# Patient Record
Sex: FEMALE | Race: WHITE | NOT HISPANIC OR LATINO | Employment: FULL TIME | URBAN - METROPOLITAN AREA
[De-identification: names, ages, dates, MRNs, and addresses within clinical notes are randomized per-mention and may not be internally consistent; named-entity substitution may affect disease eponyms.]

---

## 2021-03-26 DIAGNOSIS — Z23 ENCOUNTER FOR IMMUNIZATION: ICD-10-CM

## 2022-06-22 ENCOUNTER — EVALUATION (OUTPATIENT)
Dept: PHYSICAL THERAPY | Facility: CLINIC | Age: 62
End: 2022-06-22
Payer: COMMERCIAL

## 2022-06-22 DIAGNOSIS — M25.531 RIGHT WRIST PAIN: ICD-10-CM

## 2022-06-22 DIAGNOSIS — M65.4 DE QUERVAIN'S TENOSYNOVITIS, RIGHT: Primary | ICD-10-CM

## 2022-06-22 PROCEDURE — 97140 MANUAL THERAPY 1/> REGIONS: CPT | Performed by: PHYSICAL THERAPIST

## 2022-06-22 PROCEDURE — 97110 THERAPEUTIC EXERCISES: CPT | Performed by: PHYSICAL THERAPIST

## 2022-06-22 PROCEDURE — 97161 PT EVAL LOW COMPLEX 20 MIN: CPT | Performed by: PHYSICAL THERAPIST

## 2022-06-22 RX ORDER — ROSUVASTATIN CALCIUM 10 MG/1
10 TABLET, COATED ORAL DAILY
COMMUNITY

## 2022-06-22 RX ORDER — LISINOPRIL 5 MG/1
5 TABLET ORAL DAILY
COMMUNITY

## 2022-06-22 RX ORDER — ESOMEPRAZOLE MAGNESIUM 40 MG/1
40 CAPSULE, DELAYED RELEASE ORAL
COMMUNITY

## 2022-06-22 RX ORDER — TRAMADOL HYDROCHLORIDE 50 MG/1
50 TABLET ORAL EVERY 6 HOURS PRN
COMMUNITY

## 2022-06-22 RX ORDER — HYDROCHLOROTHIAZIDE 25 MG/1
25 TABLET ORAL DAILY
COMMUNITY

## 2022-06-22 RX ORDER — CETIRIZINE HYDROCHLORIDE 10 MG/1
10 TABLET, CHEWABLE ORAL DAILY
COMMUNITY

## 2022-06-22 RX ORDER — CLOPIDOGREL BISULFATE 75 MG/1
75 TABLET ORAL DAILY
COMMUNITY

## 2022-06-22 RX ORDER — METHOCARBAMOL 750 MG/1
750 TABLET, FILM COATED ORAL EVERY 6 HOURS PRN
COMMUNITY

## 2022-06-22 NOTE — PROGRESS NOTES
PT Evaluation     Today's date: 2022  Patient name: Nicolette Neville  : 1960  MRN: [id number]  Referring provider: Charlene Dove MD  Dx:   Encounter Diagnosis     ICD-10-CM    1  De Quervain's tenosynovitis, right  M65 4    2  Right wrist pain  M25 531        Start Time: 1430  Stop Time: 1525  Total time in clinic (min): 55 minutes    Assessment  Assessment details: 58year old female 2 months post 1st dorsal compartment release, and CRT dominant R hand  Pain range 2-6/10  Pt wearing OTC wrist immobilization splint for work and bed  At times wears comfort cool wrist thumb neoprene support  Very limited AROM wrist: F/E: , with intrinsic stiffness all fingers and CMC stiffness R thumb   setting #2: R/10#, L/41#, lateral pinch: R/9#, L/11#, 3 point pinch: R/3#, L/7#  Pt limited with some self care, home chores and at work with use dominant R hand  DASH 48% disability  Pt would benefit from a course f PT hand therapy with goal of decreasing pain and restoring ROM, strength and function in dominant R hand  Functional limitations: unable to chop, peel, cut, chop, dress/bath I; pain with keyboarding, yard work, pain with use home chores; pain with writing  Symptom irritability: moderateUnderstanding of Dx/Px/POC: good   Prognosis: good    Goals  STG- 4 weeks  22  1  I HEP   2  Decrease pain to 2-4 pain range  3  Increase AROM R wrist to 45-55 flx/ext  4  Increase R  by 10#   5  Improve DASH score to under 30% disability    LTG- 8 weeks 22  1  Decrease pain to 0-2/10  2  Increase AROM R wrist equal to l  3  Increase  and pinch R hand to % of L,   4   Improve DASH to under 15% disability    Plan  Patient would benefit from: PT eval and skilled physical therapy  Planned modality interventions: thermotherapy: hydrocollator packs  Planned therapy interventions: manual therapy, therapeutic exercise, therapeutic activities and home exercise program  Frequency: 1x week  Duration in visits: 8  Duration in weeks: 8  Plan of Care beginning date: 2022  Plan of Care expiration date: 2022        Subjective Evaluation    History of Present Illness  Date of surgery: 2022  Mechanism of injury: surgery  Mechanism of injury: Pt underwent R 1st dorsal compartment release and R CTR on 22  Pt arrived wearing OTC wrist immobilization orthosis  Pt wearing splint at work and with activity at home, also wearing at night  Pt referred for outpatient hand therapy  Recurrent probem    Quality of life: good    Pain  Current pain ratin  At best pain ratin  At worst pain ratin  Quality: throbbing, sharp, knife-like and dull ache  Relieving factors: rest and heat  Progression: no change    Social Support  Steps to enter house: yes  2  Stairs in house: yes   14  Lives in: multiple-level home  Lives with: significant other    Employment status: working ( and [employer])  Treatments  Current treatment: physical therapy  Patient Goals  Patient goals for therapy: increased strength, decreased pain, independence with ADLs/IADLs and return to sport/leisure activities  Patient goal: work without pain        Objective     Observations     Right Wrist/Hand   Positive for incision  Additional Observation Details  1 cm incision radial wrist, 2 cm incision R palm, well healed, NEI    Tenderness     Right Wrist/Hand   Tenderness in the first dorsal compartment       Neurological Testing     Sensation     Wrist/Hand   Left   Intact: light touch    Right   Diminished: light touch    Comments   Right light touch: Numbness R volar thumb    Active Range of Motion     Right Elbow   Normal active range of motion  Forearm pronation: with pain    Left Wrist   Normal active range of motion    Right Wrist   Wrist flexion: 24 degrees   Wrist extension: 42 degrees     Right Thumb   Extension     CMC: 32  Palmar Abduction    CMC: 40  Opposition: Oppose to P1 segment R LF    Additional Active Range of Motion Details  Able to close hand to fulll fist with effort and pain, intrinsic stiffness all fingers R hand    Strength/Myotome Testing     Right Elbow   Flexion: 4  Extension: 4    Left Wrist/Hand      (2nd hand position)     Trial 1: 42    Trial 2: 40    Thumb Strength  Key/Lateral Pinch     Trial 1: 11  Palmar/Three-Point Pinch     Trial 1: 7    Right Wrist/Hand   Wrist extension: 4-  Wrist flexion: 3-     (2nd hand position)     Trial 1: 5    Trial 2: 16    Thumb Strength   Key/Lateral Pinch     Trial 1: 9  Palmar/Three-Point Pinch     Trial 1: 3    Swelling     Left Wrist/Hand   Circumference wrist: 14 5 cm    Right Wrist/Hand   Circumference wrist: 15 3 cm    General Comments:      Wrist/Hand Comments  6/22/22- DASH 48% disability      Flowsheet Rows    Flowsheet Row Most Recent Value   PT/OT G-Codes    Current Score 38   Projected Score 51   FOTO information reviewed Yes   Assessment Type Evaluation             Precautions: 1st dorsal compartment release CTR 4/22/22      Manuals 6/22            Massage radial wrist over 1st dorsal compartment CRR                                                   Neuro Re-Ed             Median n glides  Med n tensions NV                                                                                          Ther Ex             Tendon glides hook/full 10x 10x            Thumb CMC ext/abd  MCP/IP F/E 10x all            Self stretch first dorsal compartment NV            AROM wrist with open hand and  fist In sup/pron    10x all            AROM shoulder flx/abd  Elbow flx  Sup/pron -                                                   Ther Activity             Prayer stretch 2 x 15"            Wrist isometrics  Thumb isometrics NV            putty  digiflex NV                                      Modalities

## 2022-06-22 NOTE — LETTER
2022    MD Armando [address] [place] [address]    Patient: Branden Hutton   YOB: 1960   Date of Visit: 2022     Encounter Diagnosis     ICD-10-CM    1  De Quervain's tenosynovitis, right  M65 4    2  Right wrist pain  M25 531        Dear Dr Mendiola Service:    Thank you for your recent referral of Branden Hutton  Please review the attached evaluation summary from [address] recent visit  Please verify that you agree with the plan of care by signing the attached order  If you have any questions or concerns, please do not hesitate to call  I sincerely appreciate the opportunity to share in the care of one of your patients and hope to have another opportunity to work with you in the near future  Sincerely,    Mojgan Townsend, PT      Referring Provider:      I certify that I have read the below Plan of Care and certify the need for these services furnished under this plan of treatment while under my care  MD [address]ando [address] [id number]  Via Fax: [phone]          PT Evaluation     Today's date: 2022  Patient name: Branden Hutton  : 1960  MRN: [id number]  Referring provider: Ana Laura Avilez MD  Dx:   Encounter Diagnosis     ICD-10-CM    1  De Quervain's tenosynovitis, right  M65 4    2  Right wrist pain  M25 531        Start Time: 1430  Stop Time: 1525  Total time in clinic (min): 55 minutes    Assessment  Assessment details: 58year old female 2 months post 1st dorsal compartment release, and CRT dominant R hand  Pain range 2-6/10  Pt wearing OTC wrist immobilization splint for work and bed  At times wears comfort cool wrist thumb neoprene support  Very limited AROM wrist: F/E: , with intrinsic stiffness all fingers and CMC stiffness R thumb   setting #2: R/10#, L/41#, lateral pinch: R/9#, L/11#, 3 point pinch: R/3#, L/7#    Pt limited with some self care, home chores and at work with use dominant R hand  DASH 48% disability  Pt would benefit from a course f PT hand therapy with goal of decreasing pain and restoring ROM, strength and function in dominant R hand  Functional limitations: unable to chop, peel, cut, chop, dress/bath I; pain with keyboarding, yard work, pain with use home chores; pain with writing  Symptom irritability: moderateUnderstanding of Dx/Px/POC: good   Prognosis: good    Goals  STG- 4 weeks  22  1  I HEP   2  Decrease pain to 2-4 pain range  3  Increase AROM R wrist to 45-55 flx/ext  4  Increase R  by 10#   5  Improve DASH score to under 30% disability    LTG- 8 weeks 22  1  Decrease pain to 0-2/10  2  Increase AROM R wrist equal to l  3  Increase  and pinch R hand to % of L,   4  Improve DASH to under 15% disability    Plan  Patient would benefit from: PT eval and skilled physical therapy  Planned modality interventions: thermotherapy: hydrocollator packs  Planned therapy interventions: manual therapy, therapeutic exercise, therapeutic activities and home exercise program  Frequency: 1x week  Duration in visits: 8  Duration in weeks: 8  Plan of Care beginning date: 2022  Plan of Care expiration date: 2022        Subjective Evaluation    History of Present Illness  Date of surgery: 2022  Mechanism of injury: surgery  Mechanism of injury: Pt underwent R 1st dorsal compartment release and R CTR on 22  Pt arrived wearing OTC wrist immobilization orthosis  Pt wearing splint at work and with activity at home, also wearing at night  Pt referred for outpatient hand therapy            Recurrent probem    Quality of life: good    Pain  Current pain ratin  At best pain ratin  At worst pain ratin  Quality: throbbing, sharp, knife-like and dull ache  Relieving factors: rest and heat  Progression: no change    Social Support  Steps to enter house: yes  2  Stairs in house: yes   14  Lives in: multiple-level home  Lives with: significant other    Employment status: working ( and [employer])  Treatments  Current treatment: physical therapy  Patient Goals  Patient goals for therapy: increased strength, decreased pain, independence with ADLs/IADLs and return to sport/leisure activities  Patient goal: work without pain        Objective     Observations     Right Wrist/Hand   Positive for incision  Additional Observation Details  1 cm incision radial wrist, 2 cm incision R palm, well healed, NEI    Tenderness     Right Wrist/Hand   Tenderness in the first dorsal compartment       Neurological Testing     Sensation     Wrist/Hand   Left   Intact: light touch    Right   Diminished: light touch    Comments   Right light touch: Numbness R volar thumb    Active Range of Motion     Right Elbow   Normal active range of motion  Forearm pronation: with pain    Left Wrist   Normal active range of motion    Right Wrist   Wrist flexion: 24 degrees   Wrist extension: 42 degrees     Right Thumb   Extension     CMC: 32  Palmar Abduction    CMC: 40  Opposition: Oppose to P1 segment R LF    Additional Active Range of Motion Details  Able to close hand to fulll fist with effort and pain, intrinsic stiffness all fingers R hand    Strength/Myotome Testing     Right Elbow   Flexion: 4  Extension: 4    Left Wrist/Hand      (2nd hand position)     Trial 1: 42    Trial 2: 40    Thumb Strength  Key/Lateral Pinch     Trial 1: 11  Palmar/Three-Point Pinch     Trial 1: 7    Right Wrist/Hand   Wrist extension: 4-  Wrist flexion: 3-     (2nd hand position)     Trial 1: 5    Trial 2: 16    Thumb Strength   Key/Lateral Pinch     Trial 1: 9  Palmar/Three-Point Pinch     Trial 1: 3    Swelling     Left Wrist/Hand   Circumference wrist: 14 5 cm    Right Wrist/Hand   Circumference wrist: 15 3 cm    General Comments:      Wrist/Hand Comments  6/22/22- DASH 48% disability      Flowsheet Rows    Flowsheet Row Most Recent Value PT/OT G-Codes    Current Score 38   Projected Score 51   FOTO information reviewed Yes   Assessment Type Evaluation             Precautions: 1st dorsal compartment release CTR 4/22/22      Manuals 6/22            Massage radial wrist over 1st dorsal compartment CRR                                                   Neuro Re-Ed             Median n glides  Med n tensions NV                                                                                          Ther Ex             Tendon glides hook/full 10x 10x            Thumb CMC ext/abd  MCP/IP F/E 10x all            Self stretch first dorsal compartment NV            AROM wrist with open hand and  fist In sup/pron    10x all            AROM shoulder flx/abd  Elbow flx  Sup/pron -                                                   Ther Activity             Prayer stretch 2 x 15"            Wrist isometrics  Thumb isometrics NV            putty  digiflex NV                                      Modalities

## 2022-06-30 ENCOUNTER — OFFICE VISIT (OUTPATIENT)
Dept: PHYSICAL THERAPY | Facility: CLINIC | Age: 62
End: 2022-06-30
Payer: COMMERCIAL

## 2022-06-30 DIAGNOSIS — M65.4 DE QUERVAIN'S TENOSYNOVITIS, RIGHT: ICD-10-CM

## 2022-06-30 DIAGNOSIS — M25.531 RIGHT WRIST PAIN: Primary | ICD-10-CM

## 2022-06-30 PROCEDURE — 97110 THERAPEUTIC EXERCISES: CPT | Performed by: PHYSICAL THERAPIST

## 2022-06-30 PROCEDURE — 97530 THERAPEUTIC ACTIVITIES: CPT | Performed by: PHYSICAL THERAPIST

## 2022-06-30 PROCEDURE — 97140 MANUAL THERAPY 1/> REGIONS: CPT | Performed by: PHYSICAL THERAPIST

## 2022-06-30 NOTE — PROGRESS NOTES
Daily Note     Today's date: 2022  Patient name: Licha Jacobsen  : 1960  MRN: [id number]  Referring provider: Isabella Rucker MD  Dx:   Encounter Diagnosis     ICD-10-CM    1  Right wrist pain  M25 531    2  De Quervain's tenosynovitis, right  M65 4                   Subjective: Constant ache 2/10, @ worst 10/10 when bumped  Objective: See treatment diary below      Assessment: Finger flexibility improved, able to close hand to full fist with ease today  IF/MF intrinsics exhibit mild stiffness  Wrist stiffness persists although improved  Pain end range wrist flx and ext, pain end range forearm pronation  Initiated isometric strengthening for wrist and thumb extensors without increase in pain  Continue PT with goal of resuming all pre-morbid activity  Plan: Progress treatment as tolerated         Precautions: 1st dorsal compartment release CTR 22      Manuals            Massage radial wrist over 1st dorsal compartment CRR CRR           PROM/sretch jt mobs R wrist for flx/ext  CRR                                     Neuro Re-Ed             Median n glides  Med n tensions NV 10x  -                                                                                         Ther Ex             Tendon glides hook/full 10x 10x 2 x 10/10           Thumb CMC ext/abd  MCP/IP F/E 10x all 2 x 10           Self stretch first dorsal compartment NV 2 x 15"           AROM wrist with open hand and  fist In sup/pron    10x all 20x in sup/pron open hand and fist           AROM shoulder flx/abd  Elbow flx  Sup/pron - Sup/prom  20x open hand and fist                                                  Ther Activity             Prayer stretch 2 x 15" 2 x 15'           Wrist isometrics  Thumb isometrics NV 10x all  10x all           putty  digiflex NV  1 min  digiflex Y 10x all, 10x each                                     Modalities

## 2022-07-06 ENCOUNTER — APPOINTMENT (OUTPATIENT)
Dept: PHYSICAL THERAPY | Facility: CLINIC | Age: 62
End: 2022-07-06
Payer: COMMERCIAL

## 2022-07-13 ENCOUNTER — OFFICE VISIT (OUTPATIENT)
Dept: PHYSICAL THERAPY | Facility: CLINIC | Age: 62
End: 2022-07-13
Payer: COMMERCIAL

## 2022-07-13 DIAGNOSIS — M25.531 RIGHT WRIST PAIN: ICD-10-CM

## 2022-07-13 DIAGNOSIS — M65.4 DE QUERVAIN'S TENOSYNOVITIS, RIGHT: Primary | ICD-10-CM

## 2022-07-13 PROCEDURE — 97530 THERAPEUTIC ACTIVITIES: CPT | Performed by: PHYSICAL THERAPIST

## 2022-07-13 PROCEDURE — 97140 MANUAL THERAPY 1/> REGIONS: CPT | Performed by: PHYSICAL THERAPIST

## 2022-07-13 PROCEDURE — 97110 THERAPEUTIC EXERCISES: CPT | Performed by: PHYSICAL THERAPIST

## 2022-07-13 NOTE — PROGRESS NOTES
Daily Note     Today's date: 2022  Patient name: Clifford Ivan  : 1960  MRN: [id number]  Referring provider: Cruz Moran MD  Dx:   Encounter Diagnosis     ICD-10-CM    1  De Quervain's tenosynovitis, right  M65 4    2  Right wrist pain  M25 531        Start Time: 1700  Stop Time: 1740  Total time in clinic (min): 40 minutes    Subjective: Current pain 2/10, @ best 2/10, @ worst 6/10      Objective: See treatment diary below      Assessment: Slow progress with flexibility, stiffness persists  Radial wrist pain with resistive wrist flx/ext  Initiated 1# PRE's for wrist with some pain noted  Progressing with ROM, strengthening for wrist and   Continue PT with goal of increasing activity level without increase in pain  Plan: Progress treatment as tolerated         Precautions: 1st dorsal compartment release CTR 22      Manuals           Massage radial wrist over 1st dorsal compartment CRR CRR CRR          PROM/sretch jt mobs R wrist for flx/ext  CRR CRR                                    Neuro Re-Ed             Median n glides  Med n tensions NV 10x  - 10x                                                                                        Ther Ex             Tendon glides hook/full 10x 10x 2 x 10/10 2 x 10/10          Thumb CMC ext/abd  MCP/IP F/E 10x all 2 x 10 2 x 10          Self stretch first dorsal compartment NV 2 x 15" 2 x 15"          AROM wrist with open hand and  fist In sup/pron    10x all 20x in sup/pron open hand and fist Sup/pron 1# hammer 20x    Wrist F/E 20x/20x 1#          AROM shoulder flx/abd  Elbow flx  Sup/pron - Sup/prom  20x open hand and fist                                                  Ther Activity             Prayer stretch 2 x 15" 2 x 15' 2 x 15"          Wrist isometrics  Thumb isometrics NV 10x all  10x all 10x   10x          putty  digiflex NV  1 min  digiflex Y 10x all, 10x each digiflex Y/R 10x all, 10x each Modalities

## 2022-07-14 ENCOUNTER — APPOINTMENT (OUTPATIENT)
Dept: PHYSICAL THERAPY | Facility: CLINIC | Age: 62
End: 2022-07-14
Payer: COMMERCIAL

## 2022-07-21 ENCOUNTER — OFFICE VISIT (OUTPATIENT)
Dept: PHYSICAL THERAPY | Facility: CLINIC | Age: 62
End: 2022-07-21
Payer: COMMERCIAL

## 2022-07-21 DIAGNOSIS — M65.4 DE QUERVAIN'S TENOSYNOVITIS, RIGHT: ICD-10-CM

## 2022-07-21 DIAGNOSIS — M25.531 RIGHT WRIST PAIN: Primary | ICD-10-CM

## 2022-07-21 PROCEDURE — 97530 THERAPEUTIC ACTIVITIES: CPT | Performed by: PHYSICAL THERAPIST

## 2022-07-21 PROCEDURE — 97140 MANUAL THERAPY 1/> REGIONS: CPT | Performed by: PHYSICAL THERAPIST

## 2022-07-21 PROCEDURE — 97110 THERAPEUTIC EXERCISES: CPT | Performed by: PHYSICAL THERAPIST

## 2022-07-21 NOTE — PROGRESS NOTES
Daily Note     Today's date: 2022  Patient name: Shoshana Mcdonnell  : 1960  MRN: [id number]  Referring provider: Yarely Tarango MD  Dx:   Encounter Diagnosis     ICD-10-CM    1  Right wrist pain  M25 531    2  De Quervain's tenosynovitis, right  M65 4                   Subjective:  Pain range 2/10 currently, @ worst 6/10  More pain with use, especially when writing  Objective: See treatment diary below      Assessment: Pt very guarded with all use R wrist and hand, continues to have pain with end range ext  Progressing with strengthening as tolerated; arjun interventions to tolerance  Less radial wrist pain  Continue PT with goal of resuming pre-morbid activity  Plan: Progress treatment as tolerated         Precautions: 1st dorsal compartment release CTR 22      Manuals          Massage radial wrist over 1st dorsal compartment CRR CRR CRR CRR  IASTM dorsal/radial wrist and forearm         PROM/sretch jt mobs R wrist for flx/ext  CRR CRR CRR                          FOTO         Neuro Re-Ed             Median n glides  Med n tensions NV 10x  - 10x 10x                                                                                       Ther Ex             Tendon glides hook/full 10x 10x 2 x 10/10 2 x 10/10 2 x 10/10         Thumb CMC ext/abd  MCP/IP F/E 10x all 2 x 10 2 x 10 2 x 10         Self stretch first dorsal compartment NV 2 x 15" 2 x 15" 2 x 15"         AROM wrist with open hand and  fist In sup/pron    10x all 20x in sup/pron open hand and fist Sup/pron 1# hammer 20x    Wrist F/E 20x/20x 1# 20x open hand and fist in sup and pron    20x wrist ZF/E 1#    20x 1# hammer         AROM shoulder flx/abd  Elbow flx  Sup/pron - Sup/prom  20x open hand and fist                                                  Ther Activity             Prayer stretch 2 x 15" 2 x 15' 2 x 15" 2 x 15"         Wrist isometrics  Thumb isometrics NV 10x all  10x all 10x   10x 10x    10x putty  digiflex NV  1 min  digiflex Y 10x all, 10x each digiflex Y/R 10x all, 10x each    digiflex Y/R 10x all, 10x each         Red flexbar    20x F/E                      Modalities

## 2022-07-28 ENCOUNTER — OFFICE VISIT (OUTPATIENT)
Dept: PHYSICAL THERAPY | Facility: CLINIC | Age: 62
End: 2022-07-28
Payer: COMMERCIAL

## 2022-07-28 DIAGNOSIS — M65.4 DE QUERVAIN'S TENOSYNOVITIS, RIGHT: Primary | ICD-10-CM

## 2022-07-28 DIAGNOSIS — M25.531 RIGHT WRIST PAIN: ICD-10-CM

## 2022-07-28 PROCEDURE — 97110 THERAPEUTIC EXERCISES: CPT | Performed by: PHYSICAL THERAPIST

## 2022-07-28 PROCEDURE — 97530 THERAPEUTIC ACTIVITIES: CPT | Performed by: PHYSICAL THERAPIST

## 2022-07-28 PROCEDURE — 97140 MANUAL THERAPY 1/> REGIONS: CPT | Performed by: PHYSICAL THERAPIST

## 2022-07-28 NOTE — PROGRESS NOTES
Daily Note     Today's date: 2022  Patient name: Guy Martin  : 1960  MRN: [id number]  Referring provider: Dave Arnold MD  Dx:   Encounter Diagnosis     ICD-10-CM    1  De Quervain's tenosynovitis, right  M65 4    2  Right wrist pain  M25 531        Start Time: 1600  Stop Time: 1640  Total time in clinic (min): 40 minutes    Subjective: Pt states R wrist is sore from working on computer all day, current pain 3/10, @ best 1/10, @ worst 5/10      Objective: See treatment diary below      Assessment: Improving ROM, still stiff in wrist ext but less so  Has had several days this week with very minimal pain  Progressing with arjun interventions for flexibility and strengthening  Continue PT with goal of resuming all pre-morbid activity with minimal pain  Plan: Progress treatment as tolerated         Precautions: 1st dorsal compartment release CTR 22      Manuals         Massage radial wrist over 1st dorsal compartment CRR CRR CRR CRR  IASTM dorsal/radial wrist and forearm CRR        PROM/sretch jt mobs R wrist for flx/ext  CRR CRR CRR CRR                         FOTO         Neuro Re-Ed             Median n glides  Med n tensions NV 10x  - 10x 10x 10x                                                                                      Ther Ex             Tendon glides hook/full 10x 10x 2 x 10/10 2 x 10/10 2 x 10/10 2 x 10  2 x 10        Thumb CMC ext/abd  MCP/IP F/E 10x all 2 x 10 2 x 10 2 x 10 2 x 10        Self stretch first dorsal compartment NV 2 x 15" 2 x 15" 2 x 15" 2 x 15"        AROM wrist with open hand and  fist In sup/pron    10x all 20x in sup/pron open hand and fist Sup/pron 1# hammer 20x    Wrist F/E 20x/20x 1# 20x open hand and fist in sup and pron    20x wrist ZF/E 1#    20x 1# hammer 20x/20x F/E 1#    20x 1# hammer        AROM shoulder flx/abd  Elbow flx  Sup/pron - Sup/prom  20x open hand and fist                                                  Ther Activity             Prayer stretch 2 x 15" 2 x 15' 2 x 15" 2 x 15" 2 x 15"        Wrist isometrics  Thumb isometrics NV 10x all  10x all 10x   10x 10x    10x 10x    10x        putty  digiflex NV  1 min  digiflex Y 10x all, 10x each digiflex Y/R 10x all, 10x each    digiflex Y/R 10x all, 10x each Y/R/G 10x all, 10 x each        Red flexbar    20x F/E 20x F/E        Wrist maze exerciser     4x        Modalities

## 2022-08-10 ENCOUNTER — EVALUATION (OUTPATIENT)
Dept: PHYSICAL THERAPY | Facility: CLINIC | Age: 62
End: 2022-08-10
Payer: COMMERCIAL

## 2022-08-10 DIAGNOSIS — M65.4 DE QUERVAIN'S TENOSYNOVITIS, RIGHT: ICD-10-CM

## 2022-08-10 DIAGNOSIS — M25.531 RIGHT WRIST PAIN: Primary | ICD-10-CM

## 2022-08-10 PROCEDURE — 97530 THERAPEUTIC ACTIVITIES: CPT | Performed by: PHYSICAL THERAPIST

## 2022-08-10 PROCEDURE — 97164 PT RE-EVAL EST PLAN CARE: CPT | Performed by: PHYSICAL THERAPIST

## 2022-08-10 PROCEDURE — 97110 THERAPEUTIC EXERCISES: CPT | Performed by: PHYSICAL THERAPIST

## 2022-08-10 PROCEDURE — 97140 MANUAL THERAPY 1/> REGIONS: CPT | Performed by: PHYSICAL THERAPIST

## 2022-08-10 NOTE — LETTER
2022    Anna Hartman MD  [facility] [address] [place] [address]    Patient: Shamar Pickens   YOB: 1960   Date of Visit: 8/10/2022     Encounter Diagnosis     ICD-10-CM    1  Right wrist pain  M25 531    2  De Quervain's tenosynovitis, right  M65 4        Dear Dr Bubba Cockayne:    Thank you for your recent referral of Shamar Pickens  Please review the attached evaluation summary from [address] recent visit  Please verify that you agree with the plan of care by signing the attached order  If you have any questions or concerns, please do not hesitate to call  I sincerely appreciate the opportunity to share in the care of one of your patients and hope to have another opportunity to work with you in the near future  Sincerely,    Kendrick Naylor, PT      Referring Provider:      I certify that I have read the below Plan of Care and certify the need for these services furnished under this plan of treatment while under my care  Anna Hartman MD  [facility]  Via Fax: [phone]          PT Re-Evaluation     Today's date: 8/10/2022  Patient name: Shamar Pickens  : 1960  MRN: [id number]  Referring provider: Mario Lowe MD  Dx:   Encounter Diagnosis     ICD-10-CM    1  Right wrist pain  M25 531    2  De Quervain's tenosynovitis, right  M65 4        Start Time: 8511  Stop Time: 1625  Total time in clinic (min): 55 minutes    Assessment  Assessment details: 58year old female 3 months post 1st dorsal compartment release, and CRT dominant R hand  Pain range 2-6/10  No longer wearing splint to work  Very limited AROM wrist: F/E: 24/42, with intrinsic stiffness all fingers and CMC stiffness R thumb   setting #2: R/25#, L/42#, lateral pinch: R/9#, L/11#, 3 point pinch: R/5#, L/8#  Improved function with use of R hand, using more for home chores and at work as   Able to complete some yard work  Still painful with writing, better with built up pen  DASH 31% disability  Continue PT 2-4 weeks tapering to DC with goal of increasing activity level with less pain  Functional limitations: all function improved with less pain with exception of writing  Pain with prolonged use of computer  Symptom irritability: moderateUnderstanding of Dx/Px/POC: good   Prognosis: good    Goals  STG- 4 weeks  22  1  I HEP (MET)  2  Decrease pain to 2-4 pain range (progressing)  3  Increase AROM R wrist to 45-55 flx/ext (progressing)  4  Increase R  by 10# (MET)  5  Improve DASH score to under 30% disability    LTG- 8 weeks 22  1  Decrease pain to 0-2/10  2  Increase AROM R wrist equal to l  3  Increase  and pinch R hand to % of L,   4  Improve DASH to under 15% disability    Plan  Patient would benefit from: PT eval and skilled physical therapy  Planned modality interventions: thermotherapy: hydrocollator packs  Planned therapy interventions: manual therapy, therapeutic exercise, therapeutic activities and home exercise program  Frequency: 1x week  Duration in visits: 4  Duration in weeks: 4  Plan of Care beginning date: 2022  Plan of Care expiration date: 2022        Subjective Evaluation    History of Present Illness  Date of surgery: 2022  Mechanism of injury: surgery  Mechanism of injury: 8/10/22- Minimal ache currently, 1-6/10 range  Very painful when bumped  Pt notes improvement in activity and strength  Pt underwent R 1st dorsal compartment release and R CTR on 22  Pt arrived wearing OTC wrist immobilization orthosis  Pt wearing splint at work and with activity at home, also wearing at night  Pt referred for outpatient hand therapy            Recurrent probem    Quality of life: good    Pain  Current pain ratin  At best pain ratin  At worst pain ratin  Quality: sharp and dull ache  Relieving factors: rest and heat  Progression: no change    Social Support  Steps to enter house: yes  2  Stairs in house: yes   14  Lives in: multiple-level home  Lives with: significant other    Employment status: working ( and [employer])  Treatments  Current treatment: physical therapy  Patient Goals  Patient goals for therapy: increased strength, decreased pain, independence with ADLs/IADLs and return to sport/leisure activities  Patient goal: work without pain        Objective     Observations     Right Wrist/Hand   Positive for incision  Additional Observation Details  1 cm incision radial wrist, 2 cm incision R palm, well healed, NEI    Tenderness     Right Wrist/Hand   Tenderness in the first dorsal compartment       Neurological Testing     Sensation     Wrist/Hand   Left   Intact: light touch    Right   Intact: light touch    Comments   Right light touch: Numbness R volar thumb    Active Range of Motion     Right Elbow   Normal active range of motion  Forearm pronation: with pain    Left Wrist   Normal active range of motion    Right Wrist   Wrist flexion: 35 degrees   Wrist extension: 55 degrees   Radial deviation: 5 degrees   Ulnar deviation: 18 degrees     Right Thumb   Extension     CMC: 32  Palmar Abduction    CMC: 40  Opposition: Oppose to P1 segment R LF    Additional Active Range of Motion Details  Intrinsic stiffness resolved with exception of IF    Strength/Myotome Testing     Right Elbow   Flexion: 4  Extension: 4    Left Wrist/Hand      (2nd hand position)     Trial 1: 38    Trial 2: 42    Thumb Strength  Key/Lateral Pinch     Trial 1: 11  Palmar/Three-Point Pinch     Trial 1: 8    Trial 2: 9    Right Wrist/Hand   Wrist extension: 4-  Wrist flexion: 4     (2nd hand position)     Trial 1: 17    Trial 2: 25    Thumb Strength   Key/Lateral Pinch     Trial 1: 9  Palmar/Three-Point Pinch     Trial 1: 5    Trial 2: 5    Swelling     Left Wrist/Hand   Circumference wrist: 14 5 cm    Right Wrist/Hand   Circumference wrist: 15 3 cm    General Comments:      Wrist/Hand Comments  6/22/22- DASH 48% disability    8/10/22- DASH 31% disability             Precautions: 1st dorsal compartment release CTR 4/22/22        Manuals 6/22 6/30 7/13 7/21 7/28 8/10       Massage radial wrist over 1st dorsal compartment CRR CRR CRR CRR  IASTM dorsal/radial wrist and forearm CRR CRR       PROM/sretch jt mobs R wrist for flx/ext  CRR CRR CRR CRR CRR                        FOTO         Neuro Re-Ed             Median n glides  Med n tensions NV 10x  - 10x 10x 10x 10x                                                                                     Ther Ex             Tendon glides hook/full 10x 10x 2 x 10/10 2 x 10/10 2 x 10/10 2 x 10  2 x 10 2 x 10  2 x 10       Thumb CMC ext/abd  MCP/IP F/E 10x all 2 x 10 2 x 10 2 x 10 2 x 10 2 x 10       Self stretch first dorsal compartment NV 2 x 15" 2 x 15" 2 x 15" 2 x 15" 2 x 15"       AROM wrist with open hand and  fist In sup/pron    10x all 20x in sup/pron open hand and fist Sup/pron 1# hammer 20x    Wrist F/E 20x/20x 1# 20x open hand and fist in sup and pron    20x wrist ZF/E 1#    20x 1# hammer 20x/20x F/E 1#    20x 1# hammer 20x/20x 2#    20x 1# hammer       AROM shoulder flx/abd  Elbow flx  Sup/pron - Sup/prom  20x open hand and fist                                                  Ther Activity             Prayer stretch 2 x 15" 2 x 15' 2 x 15" 2 x 15" 2 x 15" 2 x 15"       Wrist isometrics  Thumb isometrics NV 10x all  10x all 10x   10x 10x    10x 10x    10x 10x    10x       putty  digiflex      clothespins NV  1 min  digiflex Y 10x all, 10x each digiflex Y/R 10x all, 10x each    digiflex Y/R 10x all, 10x each Y/R/G 10x all, 10 x each Y/R/G  10x al, 10x each  Y/R 3 point pinch 10x each, green was painful       Red flexbar    20x F/E 20x F/E 20x/20x       Wrist maze exerciser     4x rubberband finger ext over jar  20x       Modalities

## 2022-08-10 NOTE — PROGRESS NOTES
PT Re-Evaluation     Today's date: 8/10/2022  Patient name: Brandie Sanford  : 1960  MRN: [id number]  Referring provider: Satish Kirkland MD  Dx:   Encounter Diagnosis     ICD-10-CM    1  Right wrist pain  M25 531    2  De Quervain's tenosynovitis, right  M65 4        Start Time:   Stop Time: 1625  Total time in clinic (min): 55 minutes    Assessment  Assessment details: 58year old female 3 months post 1st dorsal compartment release, and CRT dominant R hand  Pain range 2-6/10  No longer wearing splint to work  Very limited AROM wrist: F/E: 24/42, with intrinsic stiffness all fingers and CMC stiffness R thumb   setting #2: R/25#, L/42#, lateral pinch: R/9#, L/11#, 3 point pinch: R/5#, L/8#  Improved function with use of R hand, using more for home chores and at work as   Able to complete some yard work  Still painful with writing, better with built up pen  DASH 31% disability  Continue PT 2-4 weeks tapering to DC with goal of increasing activity level with less pain  Functional limitations: all function improved with less pain with exception of writing  Pain with prolonged use of computer  Symptom irritability: moderateUnderstanding of Dx/Px/POC: good   Prognosis: good    Goals  STG- 4 weeks  22  1  I HEP (MET)  2  Decrease pain to 2-4 pain range (progressing)  3  Increase AROM R wrist to 45-55 flx/ext (progressing)  4  Increase R  by 10# (MET)  5  Improve DASH score to under 30% disability    LTG- 8 weeks 22  1  Decrease pain to 0-2/10  2  Increase AROM R wrist equal to l  3  Increase  and pinch R hand to % of L,   4   Improve DASH to under 15% disability    Plan  Patient would benefit from: PT eval and skilled physical therapy  Planned modality interventions: thermotherapy: hydrocollator packs  Planned therapy interventions: manual therapy, therapeutic exercise, therapeutic activities and home exercise program  Frequency: 1x week  Duration in visits: 4  Duration in weeks: 4  Plan of Care beginning date: 2022  Plan of Care expiration date: 2022        Subjective Evaluation    History of Present Illness  Date of surgery: 2022  Mechanism of injury: surgery  Mechanism of injury: 8/10/22- Minimal ache currently, 1-6/10 range  Very painful when bumped  Pt notes improvement in activity and strength  Pt underwent R 1st dorsal compartment release and R CTR on 22  Pt arrived wearing OTC wrist immobilization orthosis  Pt wearing splint at work and with activity at home, also wearing at night  Pt referred for outpatient hand therapy  Recurrent probem    Quality of life: good    Pain  Current pain ratin  At best pain ratin  At worst pain ratin  Quality: sharp and dull ache  Relieving factors: rest and heat  Progression: no change    Social Support  Steps to enter house: yes  2  Stairs in house: yes   14  Lives in: multiple-level home  Lives with: significant other    Employment status: working ( and [employer])  Treatments  Current treatment: physical therapy  Patient Goals  Patient goals for therapy: increased strength, decreased pain, independence with ADLs/IADLs and return to sport/leisure activities  Patient goal: work without pain        Objective     Observations     Right Wrist/Hand   Positive for incision  Additional Observation Details  1 cm incision radial wrist, 2 cm incision R palm, well healed, NEI    Tenderness     Right Wrist/Hand   Tenderness in the first dorsal compartment       Neurological Testing     Sensation     Wrist/Hand   Left   Intact: light touch    Right   Intact: light touch    Comments   Right light touch: Numbness R volar thumb    Active Range of Motion     Right Elbow   Normal active range of motion  Forearm pronation: with pain    Left Wrist   Normal active range of motion    Right Wrist   Wrist flexion: 35 degrees   Wrist extension: 55 degrees   Radial deviation: 5 degrees   Ulnar deviation: 18 degrees     Right Thumb   Extension     CMC: 32  Palmar Abduction    CMC: 40  Opposition: Oppose to P1 segment R LF    Additional Active Range of Motion Details  Intrinsic stiffness resolved with exception of IF    Strength/Myotome Testing     Right Elbow   Flexion: 4  Extension: 4    Left Wrist/Hand      (2nd hand position)     Trial 1: 38    Trial 2: 42    Thumb Strength  Key/Lateral Pinch     Trial 1: 11  Palmar/Three-Point Pinch     Trial 1: 8    Trial 2: 9    Right Wrist/Hand   Wrist extension: 4-  Wrist flexion: 4     (2nd hand position)     Trial 1: 17    Trial 2: 25    Thumb Strength   Key/Lateral Pinch     Trial 1: 9  Palmar/Three-Point Pinch     Trial 1: 5    Trial 2: 5    Swelling     Left Wrist/Hand   Circumference wrist: 14 5 cm    Right Wrist/Hand   Circumference wrist: 15 3 cm    General Comments:      Wrist/Hand Comments  6/22/22- DASH 48% disability    8/10/22- DASH 31% disability             Precautions: 1st dorsal compartment release CTR 4/22/22        Manuals 6/22 6/30 7/13 7/21 7/28 8/10       Massage radial wrist over 1st dorsal compartment CRR CRR CRR CRR  IASTM dorsal/radial wrist and forearm CRR CRR       PROM/sretch jt mobs R wrist for flx/ext  CRR CRR CRR CRR CRR                        FOTO         Neuro Re-Ed             Median n glides  Med n tensions NV 10x  - 10x 10x 10x 10x                                                                                     Ther Ex             Tendon glides hook/full 10x 10x 2 x 10/10 2 x 10/10 2 x 10/10 2 x 10  2 x 10 2 x 10  2 x 10       Thumb CMC ext/abd  MCP/IP F/E 10x all 2 x 10 2 x 10 2 x 10 2 x 10 2 x 10       Self stretch first dorsal compartment NV 2 x 15" 2 x 15" 2 x 15" 2 x 15" 2 x 15"       AROM wrist with open hand and  fist In sup/pron    10x all 20x in sup/pron open hand and fist Sup/pron 1# hammer 20x    Wrist F/E 20x/20x 1# 20x open hand and fist in sup and pron    20x wrist ZF/E 1#    20x 1# hammer 20x/20x F/E 1#    20x 1# hammer 20x/20x 2#    20x 1# hammer       AROM shoulder flx/abd  Elbow flx  Sup/pron - Sup/prom  20x open hand and fist                                                  Ther Activity             Prayer stretch 2 x 15" 2 x 15' 2 x 15" 2 x 15" 2 x 15" 2 x 15"       Wrist isometrics  Thumb isometrics NV 10x all  10x all 10x   10x 10x    10x 10x    10x 10x    10x       putty  digiflex      clothespins NV  1 min  digiflex Y 10x all, 10x each digiflex Y/R 10x all, 10x each    digiflex Y/R 10x all, 10x each Y/R/G 10x all, 10 x each Y/R/G  10x al, 10x each  Y/R 3 point pinch 10x each, green was painful       Red flexbar    20x F/E 20x F/E 20x/20x       Wrist maze exerciser     4x rubberband finger ext over jar  20x       Modalities

## 2022-08-11 ENCOUNTER — APPOINTMENT (OUTPATIENT)
Dept: PHYSICAL THERAPY | Facility: CLINIC | Age: 62
End: 2022-08-11
Payer: COMMERCIAL

## 2022-08-24 ENCOUNTER — OFFICE VISIT (OUTPATIENT)
Dept: PHYSICAL THERAPY | Facility: CLINIC | Age: 62
End: 2022-08-24
Payer: COMMERCIAL

## 2022-08-24 DIAGNOSIS — M65.4 DE QUERVAIN'S TENOSYNOVITIS, RIGHT: Primary | ICD-10-CM

## 2022-08-24 DIAGNOSIS — M25.531 RIGHT WRIST PAIN: ICD-10-CM

## 2022-08-24 PROCEDURE — 97140 MANUAL THERAPY 1/> REGIONS: CPT | Performed by: PHYSICAL THERAPIST

## 2022-08-24 PROCEDURE — 97110 THERAPEUTIC EXERCISES: CPT | Performed by: PHYSICAL THERAPIST

## 2022-08-24 PROCEDURE — 97530 THERAPEUTIC ACTIVITIES: CPT | Performed by: PHYSICAL THERAPIST

## 2022-08-24 NOTE — PROGRESS NOTES
Daily Note     Today's date: 2022  Patient name: Yumiko Tirado  : 1960  MRN: [id number]  Referring provider: Lian Grijalva MD  Dx:   Encounter Diagnosis     ICD-10-CM    1  De Quervain's tenosynovitis, right  M65 4    2  Right wrist pain  M25 531        Start Time: 1555  Stop Time: 1640  Total time in clinic (min): 45 minutes    Subjective:  Pt states R hand is more achy over past several days, pain range 2-5/10  Pt also noting R shoulder pain at times  Objective: See treatment diary below      Assessment: Pt reporting R shoulder pain at times, with active shoulder abduction had some increase in R hand paresthesias today  Progress with stretching and strengthening R wrist and hand, arjun interventions  Pt to see Dr Karen Montes tomorrow for follow-up which will determine further formal PT  Plan: Progress treatment as tolerated         Precautions: 1st dorsal compartment release CTR 22        Manuals 6/22 6/30 7/13 7/21 7/28 8/10 8/24      Massage radial wrist over 1st dorsal compartment CRR CRR CRR CRR  IASTM dorsal/radial wrist and forearm CRR CRR CRR      PROM/sretch jt mobs R wrist for flx/ext  CRR CRR CRR CRR CRR CRR                       FOTO         Neuro Re-Ed             Median n glides  Med n tensions NV 10x  - 10x 10x 10x 10x 10x                                                                                    Ther Ex             Tendon glides hook/full 10x 10x 2 x 10/10 2 x 10/10 2 x 10/10 2 x 10  2 x 10 2 x 10  2 x 10 2 x10}  2 x 10      Thumb CMC ext/abd  MCP/IP F/E 10x all 2 x 10 2 x 10 2 x 10 2 x 10 2 x 10 20x all      Self stretch first dorsal compartment NV 2 x 15" 2 x 15" 2 x 15" 2 x 15" 2 x 15" 2 x 15"      AROM wrist with open hand and  fist In sup/pron    10x all 20x in sup/pron open hand and fist Sup/pron 1# hammer 20x    Wrist F/E 20x/20x 1# 20x open hand and fist in sup and pron    20x wrist ZF/E 1#    20x 1# hammer 20x/20x F/E 1#    20x 1# hammer 20x/20x 2#    20x 1# hammer 20x/20x 2#    20x 1# hammer      AROM shoulder flx/abd  Elbow flx  Sup/pron - Sup/prom  20x open hand and fist           Shoulder flx/abd to 90       10x10x                                Ther Activity             Prayer stretch 2 x 15" 2 x 15' 2 x 15" 2 x 15" 2 x 15" 2 x 15" 2 x 15"      Wrist isometrics  Thumb isometrics NV 10x all  10x all 10x   10x 10x    10x 10x    10x 10x    10x 10x  10x      putty  digiflex      clothespins NV  1 min  digiflex Y 10x all, 10x each digiflex Y/R 10x all, 10x each    digiflex Y/R 10x all, 10x each Y/R/G 10x all, 10 x each Y/R/G  10x al, 10x each  Y/R 3 point pinch 10x each, green was painful Y/R/G 10x all, 10x each      Red flexbar    20x F/E 20x F/E 20x/20x 20x/20x      Wrist maze exerciser     4x rubberband finger ext over jar  20x       Modalities

## 2023-08-17 ENCOUNTER — TELEPHONE (OUTPATIENT)
Dept: NEUROLOGY | Facility: CLINIC | Age: 63
End: 2023-08-17

## 2023-08-17 NOTE — TELEPHONE ENCOUNTER
Reminder appt call     Patient is scheduled on 08/22/2023 @ 8 am with an arrival time 745 am in the [facility] location with Dr. Octavio Ferrara. Patient confirmed appt.

## 2023-08-22 ENCOUNTER — CONSULT (OUTPATIENT)
Dept: NEUROLOGY | Facility: CLINIC | Age: 63
End: 2023-08-22
Payer: COMMERCIAL

## 2023-08-22 VITALS
HEIGHT: 62 IN | BODY MASS INDEX: 29.81 KG/M2 | DIASTOLIC BLOOD PRESSURE: 65 MMHG | WEIGHT: 162 LBS | SYSTOLIC BLOOD PRESSURE: 141 MMHG | HEART RATE: 73 BPM

## 2023-08-22 DIAGNOSIS — R53.1 LEFT-SIDED WEAKNESS: ICD-10-CM

## 2023-08-22 DIAGNOSIS — G45.9 TIA (TRANSIENT ISCHEMIC ATTACK): Primary | ICD-10-CM

## 2023-08-22 DIAGNOSIS — G56.03 BILATERAL CARPAL TUNNEL SYNDROME: ICD-10-CM

## 2023-08-22 DIAGNOSIS — R20.0 LEFT SIDED NUMBNESS: ICD-10-CM

## 2023-08-22 DIAGNOSIS — M48.02 SPINAL STENOSIS OF CERVICAL REGION: ICD-10-CM

## 2023-08-22 PROCEDURE — 99205 OFFICE O/P NEW HI 60 MIN: CPT | Performed by: PSYCHIATRY & NEUROLOGY

## 2023-08-22 RX ORDER — GABAPENTIN 100 MG/1
100 CAPSULE ORAL 3 TIMES DAILY
COMMUNITY
Start: 2023-07-28

## 2023-08-22 NOTE — PROGRESS NOTES
Patient ID: Teddy Monson is a 61 y.o. female. Assessment/Plan:           Problem List Items Addressed This Visit        Cardiovascular and Mediastinum    TIA (transient ischemic attack) - Primary    Relevant Orders    MRI brain without contrast    Antithrombin III Activity    Prothrombin gene mutation    Protein C activity    Protein S activity    Lupus anticoagulant    Cardiolipin antibody    Beta-2 glycoprotein antibodies    Sedimentation rate, automated    C-reactive protein       Nervous and Auditory    Left-sided weakness    Relevant Medications    gabapentin (NEURONTIN) 100 mg capsule    Other Relevant Orders    EMG 1 Upper/1 Lower Neuropathy    MRI brain without contrast    Bilateral carpal tunnel syndrome       Other    Spinal stenosis of cervical region    Relevant Orders    EMG 1 Upper/1 Lower Neuropathy    Left sided numbness    Relevant Orders    EMG 1 Upper/1 Lower Neuropathy    MRI brain without contrast      Mrs. Curtis Solis presented to [facility] multiple sclerosis center for evaluation. Patient brought to main concern considering remote history of TIA with involvement of left upper left lower extremity several years ago with last imaging of the brain completed in 2015 suggest again acute or chronic ischemic or hemorrhagic changes, no signs of neoplasm or sinus demyelination, as per the chart review. Patient completed work-up for the years with recent stress test done and negative in addition to carotid Doppler ultrasound concluded no major intravascular pathology in internal carotid arteries; patient has been using Plavix 75 mg for the years; patient LDL is 114 mg/dL and within normal range while taking Crestor 10 mg tablets. Patient has strong family history of strokes in multiple family members including her brother and mother with antiphospholipid syndrome diagnosis carried by her mother; serologic work-up will be offered to complete thrombosis panel for evaluation.   Cannot entirely rule out right thalamic stroke involving left face and left upper/left lower extremity. Patient developed no sensory dysfunction involving left side of the tongue as well as left lower face at the C2 cervical nerve distribution; I discussed with the patient that localization is essential part of establishing diagnosis and innervation of the tongue is complicated which may not present as brainstem pathology considering involvement of cranial nerve V3, 7, 9 and 10 in addition to sensory dysfunction at C2+ V3 through left-sided face. Patient is to continue Plavix 75 mg with imaging of the brain will be offered during this visit. Patient also brought major concern that she has persistent left-sided weakness involving left upper left lower extremity; we personally reviewed imaging of the cervical spine, we agreed patient does have multilevel degenerative disc disease with variable degree of spinal stenosis-patient has no signs of myelomalacia and she has preserved motor function upper extremities bilaterally. Patient working with pain management team as well as [facility] teams-we agreed patient would benefit from proceeding with EMG LUE/LLE as she is to follow back with orthopedic team on planning for her next step considering surgical intervention versus observation approach. Patient will be advised against chiropractor manipulation or any means of activity which would involve whiplash movement related to injury. Patient has anterior and posterior fusion of L5-S1 with sensory dysfunction and weakness left lower extremity noted. Patient will be advised EMG to complete her work-up. Patient is to continue Robaxin in addition to gabapentin as initially offered by primary care teams. Patient is to follow-up with [facility] multiple sclerosis center on as-needed basis. Subjective: left sided numbness in her mouth, left arm, and left leg.  She would also like to hear your thoughts on her recent MRI cervical spine from [facility]    REASON FOR CONSULT:   M54.2 (ICD-10-CM) - Cervicalgia   M54.2 (ICD-10-CM) - Neck pain   R20.0, R20.2 (ICD-10-CM) - Numbness and tingling of left side of face         HPI  Mrs. Reginaldo Saeed is a 58-year-old female who presented to [facility] multiple sclerosis center for evaluation. Patient has no remote history of multiple sclerosis with evaluation by Dr. Donna Castañeda at [facility] completed in 2007. Patient has not suffered several TIAs requiring prolonged stay in the hospital as well as heparin infusion as patient has been taking Plavix for recurrent left-sided sensorimotor dysfunction since that time with normal brain imaging completed in 2015 without signs of ischemic changes; patient has been working with orthopedic team as well as pain management team with no neurology follow-up noted since 2007. Main concern today was patient developed numbness in the left side of her tongue and left lower face which has been persistent. Patient also stated sensorimotor dysfunction in left upper left lower extremity has been persistent; patient has been working with [facility] Dr. Floyd Dodge with discussion was about C3-6 vertebral fusion. Patient reports having disequilibrium because left leg going out and patient may lose balance; patient stated left side of the tongue has been named as well as left face numbness in the lower part of the face around her jaw bone;   Patient completed stress test in 2022 with normal results. Patient completed carotid Doppler ultrasound with less than 50% stenosis appreciated in bilateral internal carotid arteries. We also reviewed serological work-up including elevated B12 in the setting of patient is taking supplements. Patient has strong family history of major strokes in her mother and brother with father having Parkinson disease; patient's mother has antiphospholipid syndrome due to elevated cardiolipin antibodies;     Patient completed carpal tunnel syndrome bilaterally with excellent outcomes described. MRI C-spine October 2022:    Multiple levels of degenerative disc disease are present in the cervical spine   as described in the body the report. Areas of overall moderate spinal canal   stenosis at C3-C4, C4-C5, and C5-C6 with focal indentation of the cervical cord   at these respective levels, most conspicuously at C3-C4. There is no abnormal   cord signal to suggest cord edema that can be confirmed on 2 planes of imaging. Reidentified is significant bilateral foraminal narrowing at C4-C5, C5-C6,   C7-T1, and to a lesser extent at C6-C7. Findings are essentially unchanged compared to the prior MRI examination of   7/12/2021, noting that the patient's more straightened cervical alignment on the   current examination slightly accentuated the degree of spinal canal stenosis at   the varying levels. MRI L-spine 2017: Mild central canal stenosis and left foraminal/extraforaminal disc   protrusion at L4-5. Mild L4 anterolisthesis. These findings were not present on   the prior MRI of 2006. 2. New small left-sided disc protrusion at L3-4 which encroaches upon the left   subarticular recess and neural foramen. 3. Mild increase in the size of the central disc protrusion at T12-L1. No cord   compression. 4. Status post anterior and posterior fusion at L5-S1. MRI brain 2015: There is no restricted diffusion seen to suggest acute infarction. Cerebral   volume is age appropriate. Scattered foci of T2 hyperintensity are seen in the   periventricular and subcortical white matter. This is minimal and is a   nonspecific finding. The differential diagnosis includes small vessel ischemic   disease, demyelination, vasculitis, Lyme's disease, white matter disease can   also be seen in the setting of migraine headaches. Correlate clinically. There   is no mass effect or midline shift. No extra-axial fluid collections are   identified. No hemorrhage is seen on blood sensitive sequences.  The skull base flow voids are grossly intact. With gadolinium contrast administration, there is no abnormal enhancement   appreciated. The orbits are grossly unremarkable. The visualized paranasal sinuses are clear   except for a small mucous retention cyst or polyp in the sphenoid sinus on the   left. There is trace right mastoid fluid. The following portions of the patient's history were reviewed and updated as appropriate: She  has a past medical history of Asthma, Hypertension, and Stroke ([address]). She   Patient Active Problem List    Diagnosis Date Noted   • Spinal stenosis of cervical region 08/22/2023   • TIA (transient ischemic attack) 08/22/2023   • Left-sided weakness 08/22/2023   • Left sided numbness 08/22/2023   • Bilateral carpal tunnel syndrome 08/22/2023     She  has a past surgical history that includes Spine surgery and Carpal tunnel release (Bilateral). Her family history is not on file. She  reports that she has never smoked. She has never used smokeless tobacco. No history on file for alcohol use and drug use.   Current Outpatient Medications   Medication Sig Dispense Refill   • cetirizine (ZyrTEC) 10 MG chewable tablet Chew 10 mg daily     • clopidogrel (PLAVIX) 75 mg tablet Take 75 mg by mouth daily     • esomeprazole (NexIUM) 40 MG capsule Take 40 mg by mouth every morning before breakfast     • gabapentin (NEURONTIN) 100 mg capsule Take 100 mg by mouth 3 (three) times a day     • hydrochlorothiazide (HYDRODIURIL) 25 mg tablet Take 25 mg by mouth daily     • lisinopril (ZESTRIL) 5 mg tablet Take 5 mg by mouth daily     • methocarbamol (ROBAXIN) 750 mg tablet Take 750 mg by mouth every 6 (six) hours as needed for muscle spasms     • rosuvastatin (CRESTOR) 10 MG tablet Take 10 mg by mouth daily     • sertraline (ZOLOFT) 50 mg tablet Take 50 mg by mouth daily     • traMADol (ULTRAM) 50 mg tablet Take 50 mg by mouth every 6 (six) hours as needed for moderate pain       No current facility-administered medications for this visit. Current Outpatient Medications on File Prior to Visit   Medication Sig   • cetirizine (ZyrTEC) 10 MG chewable tablet Chew 10 mg daily   • clopidogrel (PLAVIX) 75 mg tablet Take 75 mg by mouth daily   • esomeprazole (NexIUM) 40 MG capsule Take 40 mg by mouth every morning before breakfast   • gabapentin (NEURONTIN) 100 mg capsule Take 100 mg by mouth 3 (three) times a day   • hydrochlorothiazide (HYDRODIURIL) 25 mg tablet Take 25 mg by mouth daily   • lisinopril (ZESTRIL) 5 mg tablet Take 5 mg by mouth daily   • methocarbamol (ROBAXIN) 750 mg tablet Take 750 mg by mouth every 6 (six) hours as needed for muscle spasms   • rosuvastatin (CRESTOR) 10 MG tablet Take 10 mg by mouth daily   • sertraline (ZOLOFT) 50 mg tablet Take 50 mg by mouth daily   • traMADol (ULTRAM) 50 mg tablet Take 50 mg by mouth every 6 (six) hours as needed for moderate pain     No current facility-administered medications on file prior to visit. She is allergic to iodine - food allergy, nsaids, simvastatin, statins, charentais melon (french melon), erythromycin, codeine, latex, mixed ragweed, and penicillins. .         Objective:    Blood pressure 141/65, pulse 73, height 5' 2" (1.575 m), weight 73.5 kg (162 lb). Physical Exam    Neurological Exam  CONSTITUTIONAL: NAD, pleasant. NECK: supple, no lymphadenopathy, no thyromegaly, no JVD. CARDIOVASCULAR: RRR, normal S1S2, no murmurs, no rubs. RESP: clear to auscultation bilaterally, no wheezes/rhonchi/rales. ABDOMEN: soft, non tender, non distended. SKIN: no rash or skin lesions. EXTREMITIES: no edema, pulses 2+bilaterally. PSYCH: appropriate mood and affect  NEUROLOGIC COMPREHENSIVE EXAM: Patient is oriented to person, place and time, NAD; appropriate affect. CN II, III, IV, V, VI, VII,VIII,IX,X,XI-XII intact with EOMI, PERRLA, OKN intact, VF grossly intact, fundi poorly visualized secondary to pupillary constriction; symmetric face noted. Motor: 5/5 UE/LE bilateral symmetric, 4/5 left hip flexion ; Sensory: abnormal to light touch and pinprick LLE; abnnormal vibration sensation LLE ankle; Coordination within normal limits on FTN and CARROLL testing; DTR: 2/4 through, 1/4 right patella and right biceps;  no Babinski, no clonus. Tandem gait is intact. Romberg: absent. ROS:    Review of Systems   Constitutional: Negative for appetite change, fatigue and fever. HENT: Negative. Negative for hearing loss, tinnitus, trouble swallowing and voice change. Eyes: Negative. Negative for photophobia, pain and visual disturbance. Respiratory: Negative. Negative for shortness of breath. Cardiovascular: Negative. Negative for palpitations. Gastrointestinal: Negative. Negative for nausea and vomiting. Endocrine: Negative. Negative for cold intolerance. Genitourinary: Negative. Negative for dysuria, frequency and urgency. Musculoskeletal: Positive for back pain and neck pain. Negative for gait problem and myalgias. Skin: Negative. Negative for rash. Allergic/Immunologic: Negative. Neurological: Positive for numbness (left sided ). Negative for dizziness, tremors, seizures, syncope, facial asymmetry, speech difficulty, weakness, light-headedness and headaches. Hematological: Negative. Does not bruise/bleed easily. Psychiatric/Behavioral: Negative. Negative for confusion, hallucinations and sleep disturbance. All other systems reviewed and are negative.

## 2023-08-23 ENCOUNTER — TELEPHONE (OUTPATIENT)
Dept: NEUROLOGY | Facility: CLINIC | Age: 63
End: 2023-08-23

## 2023-08-23 NOTE — TELEPHONE ENCOUNTER
Savannah Boogie  You 1 hour ago (9:33 AM)     BM  Good morning Andrew Wan,     I will start to work on this shortly and then call [facility] with the authorization information. Thank you so much. Have a good day.    Savannah Boogie

## 2023-08-23 NOTE — TELEPHONE ENCOUNTER
received vm from 8/22 at 3:04pm-my name is tyrone calling from imaging services with [facility]. We have a mutual patient coming in on the 29th that needs authorization started for her brain mri. Patient's name is Eleanor Jeffery, date of birth, 1960. She needs authorization started through her [facility] for CPT code 59876, our NPI is [id number]. And our address is [facility], [address] [place] [address]. Like I said, she's coming in on the 29th of August. If you have any questions, you can give me a call back at [phone]. Thank you.

## 2023-08-29 NOTE — TELEPHONE ENCOUNTER
Adis Dejesus  You 1 hour ago (8:48 AM)     BM  Good morning Jason Hale,     She was approved last week and I did call [facility] to let them know.      Thank you so much,   Adis Dejesus

## 2023-11-17 ENCOUNTER — TELEPHONE (OUTPATIENT)
Dept: NEUROLOGY | Facility: CLINIC | Age: 63
End: 2023-11-17

## 2023-11-18 NOTE — TELEPHONE ENCOUNTER
Emy Pickard RN  [facility] Clinical Team 3         Previous Messages       ----- Message -----  From: Sunshine Palomino MD  Sent: 10/3/2023   4:52 PM EDT  To: Emy Pickard RN; Melvi Phillips  Subject: RE: GENETIC TESTING                              Please confirm name of the test.    Please guide what genetic testing was denied and what CPT code necessary to approve it.    ----- Message -----  From: Melvi Phillips  Sent: 10/3/2023   8:03 AM EDT  To: Sunshine Palomino MD  Subject: GENETIC TESTING                                  [facility] DENIED CPT 05378 DUE TO MEDICAL NECESSITY.  POSSIBLE FOR P2P BY CALLING [phone] REF # [id number]

## 2023-11-21 NOTE — TELEPHONE ENCOUNTER
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 No specific option for "first unprovoked." Pt is greater that 48years old. Referral message sent back to Luis Eduardo Meyers asking for this to be resubmitted with new code.